# Patient Record
Sex: MALE | Race: WHITE
[De-identification: names, ages, dates, MRNs, and addresses within clinical notes are randomized per-mention and may not be internally consistent; named-entity substitution may affect disease eponyms.]

---

## 2020-06-11 ENCOUNTER — HOSPITAL ENCOUNTER (EMERGENCY)
Dept: HOSPITAL 60 - LB.ED | Age: 35
Discharge: HOME | End: 2020-06-11
Payer: COMMERCIAL

## 2020-06-11 DIAGNOSIS — S61.214A: Primary | ICD-10-CM
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
## 2020-06-11 NOTE — EDM.PDOC
ED HPI GENERAL MEDICAL PROBLEM





- General


Chief Complaint: Laceration


Stated Complaint: CUT RIGHT RING FINGER


Time Seen by Provider: 06/11/20 15:38


Source of Information: Reports: Patient


History Limitations: Reports: No Limitations





- History of Present Illness


INITIAL COMMENTS - FREE TEXT/NARRATIVE: 





pt presents to the ER after an injury to the ring finger of his right hand. he 

states he was lowering a  cover to the ground when the tip of his finger 

was pinched between the cast iron cover and the ring. pt states he was wearing 

protective gloves at the time. bleeding controlled with bandaids and skin flap 

replaced over injury and pt states minimal pain. pt denies any other injury. 

last tetanus according to pt was 2-3 years ago. 


Associated Symptoms: Reports: No Other Symptoms


Other Treatments PTA: bandaid


  ** Right Finger-Index


Pain Score (Numeric/FACES): 1





- Related Data


 Allergies











Allergy/AdvReac Type Severity Reaction Status Date / Time


 


No Known Allergies Allergy   Verified 06/11/20 15:38











Home Meds: 


 Home Meds





NK [No Known Home Meds]  06/11/20 [History]











Past Medical History





- Past Health History


Medical/Surgical History: Denies Medical/Surgical History





- Past Surgical History


Other Musculoskeletal Surgeries/Procedures:: rt arm tingling pain pinched netve 

x 2 yrs





Social & Family History





- Tobacco Use


Smoking Status *Q: Never Smoker


Second Hand Smoke Exposure: No





- Caffeine Use


Caffeine Use: Reports: Soda





- Recreational Drug Use


Recreational Drug Use: No





ED ROS GENERAL





- Review of Systems


Review Of Systems: Comprehensive ROS is negative, except as noted in HPI.





ED EXAM, SKIN/RASH


Exam: See Below


General Appearance: Alert, WD/WN, No Apparent Distress


Respiratory/Chest: No Respiratory Distress, Normal Breath Sounds, No Accessory 

Muscle Use


Cardiovascular: Normal Peripheral Pulses, Regular Rate, Rhythm


Peripheral Pulses: 2+: Radial (L), Radial (R)


Neurological: Alert, Oriented, Normal Cognition, Normal Gait


Psychiatric: Normal Affect, Normal Mood


Skin: Warm, Dry, Other (avulsion injury to distal ring finger right hand.)





ED SKIN PROCEDURES





- Laceration/Wound Repair


  ** Right Distal Digit - 4th (Ring)


Appearance: Clean


Distal NVT: Neuro & Vascular Intact


Skin Prep: Chlorhexidine (Hibiciens)


Exploration/Debridement/Repair: Wound Explored, Explored to Base, Minimal 

Debridement, No Foreign Material Found, Multiple Flaps Aligned


Closed with: Dermabond


Lac/Wound length In cm: 4 (semi-circular)


Tetanus Status Addressed: Yes


Complications: No





Course





- Vital Signs


Last Recorded V/S: 





 Last Vital Signs











Temp  98 F   06/11/20 15:38


 


Pulse  69   06/11/20 15:38


 


Resp  18   06/11/20 15:38


 


BP  118/81   06/11/20 15:38


 


Pulse Ox  100   06/11/20 15:38














- Orders/Labs/Meds


Orders: 





 Active Orders 24 hr











 Category Date Time Status


 


 Hand Comp Min 3V Rt [CR] Stat Exams  06/11/20 15:53 Ordered














Departure





- Departure


Time of Disposition: 16:30


Disposition: Home, Self-Care 01


Condition: Good


Clinical Impression: 


 Avulsion of finger tip








- Discharge Information


*PRESCRIPTION DRUG MONITORING PROGRAM REVIEWED*: Not Applicable


*COPY OF PRESCRIPTION DRUG MONITORING REPORT IN PATIENT VIRAL: Not Applicable


Referrals: 


PCP,None [Primary Care Provider] - 


Additional Instructions: 


keep area as clean as possible for the next 1-2 weeks. do not apply ointments 

to this finger as they break down the glue.


the skin on the tip of your finger may turn black and fall off. new skin will 

grown underneath. if you notice spreading redness or infection, see your 

regular doctor or return to the ER as antibiotics will be needed at that time. 


xray shows no break, this is good news.


glue will fall off in 3-7 days on its own.





Sepsis Event Note (ED)





- Evaluation


Sepsis Screening Result: No Definite Risk





- Focused Exam


Vital Signs: 





 Vital Signs











  Temp Pulse Resp BP Pulse Ox


 


 06/11/20 15:38  98 F  69  18  118/81  100














- Problem List & Annotations


(1) Avulsion of finger tip


SNOMED Code(s): 970572420, 673807571


   Code(s): S61.209A - UNSP OPEN WOUND OF UNSP FINGER W/O DAMAGE TO NAIL, INIT 

  Status: Acute   Current Visit: Yes   





- Problem List Review


Problem List Initiated/Reviewed/Updated: Yes





- My Orders


Last 24 Hours: 





My Active Orders





06/11/20 15:53


Hand Comp Min 3V Rt [CR] Stat 














- Assessment/Plan


Last 24 Hours: 





My Active Orders





06/11/20 15:53


Hand Comp Min 3V Rt [CR] Stat

## 2020-06-11 NOTE — CR
DATE OF SERVICE:  06/11/20

CLINICAL DATA:  distal ring finger injury



RIGHT HAND:  



No priors. 



No acute fracture or dislocation.  No lytic or blastic bone lesions. 



[id number]
[facility]